# Patient Record
Sex: FEMALE | Race: WHITE | NOT HISPANIC OR LATINO | Employment: OTHER | URBAN - METROPOLITAN AREA
[De-identification: names, ages, dates, MRNs, and addresses within clinical notes are randomized per-mention and may not be internally consistent; named-entity substitution may affect disease eponyms.]

---

## 2023-10-06 PROBLEM — L57.0 ACTINIC KERATOSIS: Status: ACTIVE | Noted: 2023-06-15

## 2023-10-06 PROBLEM — L30.9 DERMATITIS, UNSPECIFIED: Status: ACTIVE | Noted: 2023-06-15

## 2023-10-06 PROBLEM — R21 RASH AND OTHER NONSPECIFIC SKIN ERUPTION: Status: ACTIVE | Noted: 2023-06-15

## 2023-10-06 PROBLEM — L90.5 SCAR CONDITION AND FIBROSIS OF SKIN: Status: ACTIVE | Noted: 2023-06-15

## 2023-10-06 PROBLEM — S81.802D UNSPECIFIED OPEN WOUND, LEFT LOWER LEG, SUBSEQUENT ENCOUNTER: Status: ACTIVE | Noted: 2023-06-15

## 2023-10-06 PROBLEM — S61.219A FINGER LACERATION: Status: ACTIVE | Noted: 2023-10-06

## 2023-10-06 PROBLEM — D48.5 NEOPLASM OF UNCERTAIN BEHAVIOR OF SKIN: Status: ACTIVE | Noted: 2023-06-15

## 2023-10-06 PROBLEM — L91.0 HYPERTROPHIC SCAR: Status: ACTIVE | Noted: 2023-06-15

## 2023-10-06 PROBLEM — L82.1 OTHER SEBORRHEIC KERATOSIS: Status: ACTIVE | Noted: 2023-06-15

## 2023-10-06 PROBLEM — C44.722 SQUAMOUS CELL CARCINOMA OF SKIN OF RIGHT LOWER LIMB, INCLUDING HIP: Status: ACTIVE | Noted: 2023-06-15

## 2023-10-06 PROBLEM — L40.0 PSORIASIS VULGARIS: Status: ACTIVE | Noted: 2023-06-15

## 2023-10-06 PROBLEM — D22.60 MELANOCYTIC NEVI OF UNSPECIFIED UPPER LIMB, INCLUDING SHOULDER: Status: ACTIVE | Noted: 2023-06-15

## 2023-10-06 PROBLEM — D22.70 MELANOCYTIC NEVI OF UNSPECIFIED LOWER LIMB, INCLUDING HIP: Status: ACTIVE | Noted: 2023-06-15

## 2023-10-06 PROBLEM — D22.30 MELANOCYTIC NEVI OF UNSPECIFIED PART OF FACE: Status: ACTIVE | Noted: 2023-06-15

## 2023-10-06 PROBLEM — B35.1 TINEA UNGUIUM: Status: ACTIVE | Noted: 2023-06-15

## 2023-10-06 PROBLEM — D22.5 MELANOCYTIC NEVI OF TRUNK: Status: ACTIVE | Noted: 2023-06-15

## 2023-10-06 PROBLEM — L81.4 OTHER MELANIN HYPERPIGMENTATION: Status: ACTIVE | Noted: 2023-06-15

## 2023-10-06 PROBLEM — D49.2 NEOPLASM OF UNSPECIFIED BEHAVIOR OF BONE, SOFT TISSUE, AND SKIN: Status: ACTIVE | Noted: 2023-06-15

## 2023-10-06 PROBLEM — D18.01 HEMANGIOMA OF SKIN AND SUBCUTANEOUS TISSUE: Status: ACTIVE | Noted: 2023-06-15

## 2023-10-06 PROBLEM — Z85.820 PERSONAL HISTORY OF MALIGNANT MELANOMA OF SKIN: Status: ACTIVE | Noted: 2023-06-15

## 2023-10-06 RX ORDER — TRIAMCINOLONE ACETONIDE 1 MG/G
1 CREAM TOPICAL
COMMUNITY
Start: 2016-09-02 | End: 2023-10-10 | Stop reason: ALTCHOICE

## 2023-10-06 RX ORDER — PANTOPRAZOLE SODIUM 20 MG/1
TABLET, DELAYED RELEASE ORAL
COMMUNITY
Start: 2018-09-26

## 2023-10-06 RX ORDER — CICLOPIROX 80 MG/ML
1 SOLUTION TOPICAL
COMMUNITY
Start: 2016-06-13 | End: 2023-10-10 | Stop reason: ALTCHOICE

## 2023-10-06 RX ORDER — FLUOCINONIDE 0.5 MG/G
1 CREAM TOPICAL
COMMUNITY
Start: 2020-06-15 | End: 2023-10-10 | Stop reason: ALTCHOICE

## 2023-10-06 RX ORDER — FLUOROURACIL 5 MG/G
CREAM TOPICAL
COMMUNITY
Start: 2015-10-09 | End: 2023-10-10 | Stop reason: ALTCHOICE

## 2023-10-06 RX ORDER — MUPIROCIN 20 MG/G
1 OINTMENT TOPICAL
COMMUNITY
Start: 2021-02-17 | End: 2023-10-10 | Stop reason: ALTCHOICE

## 2023-10-06 RX ORDER — FLUOCINOLONE ACETONIDE 0.1 MG/ML
1 SOLUTION TOPICAL
COMMUNITY
Start: 2020-06-17 | End: 2023-10-10 | Stop reason: ALTCHOICE

## 2023-10-06 RX ORDER — SUMATRIPTAN SUCCINATE 25 MG/1
TABLET ORAL
COMMUNITY
Start: 2018-09-26

## 2023-10-06 RX ORDER — ALENDRONATE SODIUM 70 MG/1
TABLET ORAL
COMMUNITY
Start: 2018-09-26 | End: 2023-10-10 | Stop reason: ALTCHOICE

## 2023-10-10 ENCOUNTER — OFFICE VISIT (OUTPATIENT)
Dept: DERMATOLOGY | Facility: CLINIC | Age: 68
End: 2023-10-10
Payer: MEDICARE

## 2023-10-10 DIAGNOSIS — D48.5 NEOPLASM OF UNCERTAIN BEHAVIOR OF SKIN: Primary | ICD-10-CM

## 2023-10-10 PROCEDURE — 1036F TOBACCO NON-USER: CPT | Performed by: DERMATOLOGY

## 2023-10-10 PROCEDURE — 11102 TANGNTL BX SKIN SINGLE LES: CPT | Performed by: DERMATOLOGY

## 2023-10-10 PROCEDURE — 11103 TANGNTL BX SKIN EA SEP/ADDL: CPT | Performed by: DERMATOLOGY

## 2023-10-10 PROCEDURE — 88305 TISSUE EXAM BY PATHOLOGIST: CPT | Performed by: DERMATOLOGY

## 2023-10-10 PROCEDURE — 1159F MED LIST DOCD IN RCRD: CPT | Performed by: DERMATOLOGY

## 2023-10-10 PROCEDURE — 88305 TISSUE EXAM BY PATHOLOGIST: CPT

## 2023-10-10 ASSESSMENT — DERMATOLOGY QUALITY OF LIFE (QOL) ASSESSMENT
ARE THERE EXCLUSIONS OR EXCEPTIONS FOR THE QUALITY OF LIFE ASSESSMENT: NO
WHAT SINGLE SKIN CONDITION LISTED BELOW IS THE PATIENT ANSWERING THE QUALITY-OF-LIFE ASSESSMENT QUESTIONS ABOUT: NONE OF THE ABOVE
RATE HOW EMOTIONALLY BOTHERED YOU ARE BY YOUR SKIN PROBLEM (FOR EXAMPLE, WORRY, EMBARRASSMENT, FRUSTRATION): 1
RATE HOW BOTHERED YOU ARE BY SYMPTOMS OF YOUR SKIN PROBLEM (EG, ITCHING, STINGING BURNING, HURTING OR SKIN IRRITATION): 1
RATE HOW BOTHERED YOU ARE BY EFFECTS OF YOUR SKIN PROBLEMS ON YOUR ACTIVITIES (EG, GOING OUT, ACCOMPLISHING WHAT YOU WANT, WORK ACTIVITIES OR YOUR RELATIONSHIPS WITH OTHERS): 0 - NEVER BOTHERED

## 2023-10-10 ASSESSMENT — DERMATOLOGY PATIENT ASSESSMENT
DO YOU USE SUNSCREEN: DAILY
FOR PATIENTS COMING IN FOR A FOLLOW-UP VISIT - HAVE THERE BEEN ANY CHANGES IN YOUR HEALTH SINCE YOUR LAST VISIT: NO
DO YOU HAVE ANY NEW OR CHANGING LESIONS: YES
DO YOU USE A TANNING BED: NO
ARE YOU AN ORGAN TRANSPLANT RECIPIENT: NO

## 2023-10-10 ASSESSMENT — ITCH NUMERIC RATING SCALE: HOW SEVERE IS YOUR ITCHING?: 0

## 2023-10-10 NOTE — PROGRESS NOTES
Subjective     Anali Aranda is a 68 y.o. female who presents for the following: Suspicious Skin Lesion (Pt here for lesion on right chest, and back. Chest lesion was bleeding. Pt has hx of SCC, BCC, AKs. ).     Review of Systems:  No other skin or systemic complaints other than what is documented elsewhere in the note.    The following portions of the chart were reviewed this encounter and updated as appropriate:          Skin Cancer History  No skin cancer on file.      Specialty Problems          Dermatology Problems    Actinic keratosis    Dermatitis, unspecified    Hemangioma of skin and subcutaneous tissue    Hypertrophic scar    Melanocytic nevi of trunk    Melanocytic nevi of unspecified lower limb, including hip    Melanocytic nevi of unspecified part of face    Melanocytic nevi of unspecified upper limb, including shoulder    Neoplasm of uncertain behavior of skin    Other melanin hyperpigmentation    Other seborrheic keratosis    Personal history of malignant melanoma of skin    Psoriasis vulgaris    Rash and other nonspecific skin eruption    Scar condition and fibrosis of skin    Squamous cell carcinoma of skin of right lower limb, including hip    Tinea unguium        Objective   Well appearing patient in no apparent distress; mood and affect are within normal limits.    A focused skin examination was performed of the right upper chest and right mid back. All findings within normal limits unless otherwise noted below.    Assessment/Plan   1. Neoplasm of uncertain behavior of skin (2)  Right lateral chest  Erythematous scaly papule          Lesion biopsy  Type of biopsy: tangential    Informed consent: discussed and consent obtained    Timeout: patient name, date of birth, surgical site, and procedure verified    Procedure prep:  Patient was prepped and draped  Anesthesia: the lesion was anesthetized in a standard fashion    Anesthetic:  1% lidocaine w/ epinephrine 1-100,000 local  infiltration  Instrument used: DermaBlade    Hemostasis achieved with: aluminum chloride    Outcome: patient tolerated procedure well    Post-procedure details: sterile dressing applied and wound care instructions given    Dressing type: petrolatum and bandage      Specimen 1 - Dermatopathology- DERM LAB  Differential Diagnosis: ISK vs. SCC  Check Margins Yes/No?:    Comments:    Dermpath Lab: Routine Histopathology (formalin-fixed tissue)    Right Lower Back  Pink to erythematous scaly plaque          Lesion biopsy  Type of biopsy: tangential    Informed consent: discussed and consent obtained    Timeout: patient name, date of birth, surgical site, and procedure verified    Procedure prep:  Patient was prepped and draped  Anesthesia: the lesion was anesthetized in a standard fashion    Anesthetic:  1% lidocaine w/ epinephrine 1-100,000 local infiltration  Instrument used: DermaBlade    Hemostasis achieved with: aluminum chloride    Outcome: patient tolerated procedure well    Post-procedure details: sterile dressing applied and wound care instructions given    Dressing type: petrolatum and bandage      Specimen 2 - Dermatopathology- DERM LAB  Differential Diagnosis: ISK vs. SCC  Check Margins Yes/No?:    Comments:    Dermpath Lab: Routine Histopathology (formalin-fixed tissue)    Lesion concerning for ISK vs. SCC. The need for biopsy for definitive diagnosis recommended. Risks and benefits reviewed, see procedure note.      Follow up for FBSE as scheduled with Dr. Hayes,

## 2023-10-12 LAB
LABORATORY COMMENT REPORT: NORMAL
PATH REPORT.FINAL DX SPEC: NORMAL
PATH REPORT.GROSS SPEC: NORMAL
PATH REPORT.MICROSCOPIC SPEC OTHER STN: NORMAL
PATH REPORT.RELEVANT HX SPEC: NORMAL
PATH REPORT.TOTAL CANCER: NORMAL

## 2023-12-14 ENCOUNTER — OFFICE VISIT (OUTPATIENT)
Dept: DERMATOLOGY | Facility: CLINIC | Age: 68
End: 2023-12-14
Payer: MEDICARE

## 2023-12-14 DIAGNOSIS — L81.4 LENTIGO: ICD-10-CM

## 2023-12-14 DIAGNOSIS — D18.01 HEMANGIOMA OF SKIN: ICD-10-CM

## 2023-12-14 DIAGNOSIS — L82.1 SEBORRHEIC KERATOSIS: ICD-10-CM

## 2023-12-14 DIAGNOSIS — D48.5 NEOPLASM OF UNCERTAIN BEHAVIOR OF SKIN: ICD-10-CM

## 2023-12-14 DIAGNOSIS — Z85.828 HISTORY OF NONMELANOMA SKIN CANCER: ICD-10-CM

## 2023-12-14 DIAGNOSIS — L90.5 SCAR CONDITIONS AND FIBROSIS OF SKIN: ICD-10-CM

## 2023-12-14 DIAGNOSIS — Z80.8 FAMILY HISTORY OF MELANOMA: ICD-10-CM

## 2023-12-14 DIAGNOSIS — Z12.83 SCREENING EXAM FOR SKIN CANCER: Primary | ICD-10-CM

## 2023-12-14 PROCEDURE — 88305 TISSUE EXAM BY PATHOLOGIST: CPT | Performed by: DERMATOLOGY

## 2023-12-14 PROCEDURE — 88305 TISSUE EXAM BY PATHOLOGIST: CPT | Mod: TC,DER | Performed by: DERMATOLOGY

## 2023-12-14 PROCEDURE — 1036F TOBACCO NON-USER: CPT | Performed by: DERMATOLOGY

## 2023-12-14 PROCEDURE — 1159F MED LIST DOCD IN RCRD: CPT | Performed by: DERMATOLOGY

## 2023-12-14 PROCEDURE — 11302 SHAVE SKIN LESION 1.1-2.0 CM: CPT

## 2023-12-14 PROCEDURE — 99213 OFFICE O/P EST LOW 20 MIN: CPT | Performed by: DERMATOLOGY

## 2023-12-14 ASSESSMENT — DERMATOLOGY QUALITY OF LIFE (QOL) ASSESSMENT
RATE HOW BOTHERED YOU ARE BY SYMPTOMS OF YOUR SKIN PROBLEM (EG, ITCHING, STINGING BURNING, HURTING OR SKIN IRRITATION): 0 - NEVER BOTHERED
WHAT SINGLE SKIN CONDITION LISTED BELOW IS THE PATIENT ANSWERING THE QUALITY-OF-LIFE ASSESSMENT QUESTIONS ABOUT: NONE OF THE ABOVE
ARE THERE EXCLUSIONS OR EXCEPTIONS FOR THE QUALITY OF LIFE ASSESSMENT: NO
DATE THE QUALITY-OF-LIFE ASSESSMENT WAS COMPLETED: 66822
WHAT SINGLE SKIN CONDITION LISTED BELOW IS THE PATIENT ANSWERING THE QUALITY-OF-LIFE ASSESSMENT QUESTIONS ABOUT: NONE OF THE ABOVE
RATE HOW EMOTIONALLY BOTHERED YOU ARE BY YOUR SKIN PROBLEM (FOR EXAMPLE, WORRY, EMBARRASSMENT, FRUSTRATION): 0 - NEVER BOTHERED
WHAT SINGLE SKIN CONDITION LISTED BELOW IS THE PATIENT ANSWERING THE QUALITY-OF-LIFE ASSESSMENT QUESTIONS ABOUT: NONE OF THE ABOVE
RATE HOW BOTHERED YOU ARE BY EFFECTS OF YOUR SKIN PROBLEMS ON YOUR ACTIVITIES (EG, GOING OUT, ACCOMPLISHING WHAT YOU WANT, WORK ACTIVITIES OR YOUR RELATIONSHIPS WITH OTHERS): 0 - NEVER BOTHERED

## 2023-12-14 ASSESSMENT — ITCH NUMERIC RATING SCALE: HOW SEVERE IS YOUR ITCHING?: 0

## 2023-12-14 ASSESSMENT — DERMATOLOGY PATIENT ASSESSMENT
DO YOU HAVE ANY NEW OR CHANGING LESIONS: YES
DO YOU USE A TANNING BED: NO
ARE YOU AN ORGAN TRANSPLANT RECIPIENT: NO
HAVE YOU HAD OR DO YOU HAVE VASCULAR DISEASE: NO
HAVE YOU HAD OR DO YOU HAVE A STAPH INFECTION: NO
DO YOU USE SUNSCREEN: OCCASIONALLY

## 2023-12-14 ASSESSMENT — PATIENT GLOBAL ASSESSMENT (PGA): PATIENT GLOBAL ASSESSMENT: PATIENT GLOBAL ASSESSMENT:  1 - CLEAR

## 2023-12-14 NOTE — PROGRESS NOTES
Subjective   Anali Aranda is a 68 y.o. female with a PMHx of NMSC and family history of melanoma who presents for the following: Skin Check (Patient presents today for 6 month skin exam).    Skin Cancer Screening  She has a history of moderate sun exposure. She is in the sun occasionally. She uses sunscreen occasionally. She reports itching. Her moles are not changing.    Spots that concern her: Left calf, right back, right nose, right eyebrow, bilateral arms, left ankle, left cheek    History of Skin Cancer  History of Basal Cell Carcinoma  History of Squamous Cell Carcinoma  History of Actinic Keratosis      Objective   Well appearing patient in no apparent distress; mood and affect are within normal limits.    A full examination was performed including scalp, head, eyes, ears, nose, lips, neck, chest, axillae, abdomen, back, buttocks, bilateral upper extremities, bilateral lower extremities, hands, feet, fingers, toes, fingernails, and toenails. All findings within normal limits unless otherwise noted below.    Scattered cherry-red papule(s).    Scattered tan macules in sun-exposed areas.    Stuck on verrucous, tan-brown papules and plaques.      Scars are well-healed without evidence of recurrence    Left posterior lower leg  1.1 cm brown, pink indurated plaque            Assessment/Plan   Screening exam for skin cancer    Neoplasm of uncertain behavior of skin  Left posterior lower leg    Shave removal    Lesion diameter (cm):  1.1  Informed consent: discussed and consent obtained    Timeout: patient name, date of birth, surgical site, and procedure verified    Procedure prep:  Patient was prepped and draped  Anesthesia: the lesion was anesthetized in a standard fashion    Anesthetic:  1% lidocaine w/ epinephrine 1-100,000 local infiltration  Instrument used: DermaBlade    Hemostasis achieved with: aluminum chloride    Outcome: patient tolerated procedure well    Post-procedure details: sterile dressing  applied and wound care instructions given    Dressing type: bandage and petrolatum      Staff Communication: Dermatology Local Anesthesia: 1 % Lidocaine / Epinephrine - Amount: 3ccs    Specimen 1 - Dermatopathology- DERM LAB  Differential Diagnosis: collision SK vs scar vs nevus  Check Margins Yes/No?:  yes  Comments:    Dermpath Lab: Routine Histopathology (formalin-fixed tissue)    Related Procedures  Follow Up In Dermatology - Established Patient    Hemangioma of skin    Benign finding; no intervention indicated today.    Lentigo    The ABCDEs of melanoma and warning signs of non-melanoma skin cancer were discussed with patient and patient expressed understanding. Sun protection and use of at least SPF 30 discussed with patient. Pt instructed to reapply every 2 hours.     Seborrheic keratosis    Benign finding; no intervention indicated today.    Scar conditions and fibrosis of skin    Reassured patient that previous procedure sites look healthy    History of nonmelanoma skin cancer    Family history of melanoma      Return to clinic in 6 mo for FBSE.    Idania Giron MD, LOW  PGY-2, Department of Dermatology    I saw and evaluated the patient. I personally obtained the key and critical portions of the history and physical exam or was physically present for key and critical portions performed by the student/resident. I reviewed the student/resident's documentation and discussed the patient with the student/resident. I was present for the entirety of any procedure(s). I agree with the student/resident's medical decision making as documented in the note.

## 2023-12-19 DIAGNOSIS — C44.729 SQUAMOUS CELL CARCINOMA OF SKIN OF LEFT LOWER LIMB, INCLUDING HIP: Primary | ICD-10-CM

## 2024-01-12 ENCOUNTER — OFFICE VISIT (OUTPATIENT)
Dept: DERMATOLOGY | Facility: CLINIC | Age: 69
End: 2024-01-12
Payer: MEDICARE

## 2024-01-12 DIAGNOSIS — C44.92 SQUAMOUS CELL CARCINOMA OF SKIN: ICD-10-CM

## 2024-01-12 PROCEDURE — 1159F MED LIST DOCD IN RCRD: CPT | Performed by: DERMATOLOGY

## 2024-01-12 PROCEDURE — 17313 MOHS 1 STAGE T/A/L: CPT | Performed by: STUDENT IN AN ORGANIZED HEALTH CARE EDUCATION/TRAINING PROGRAM

## 2024-01-12 PROCEDURE — 1036F TOBACCO NON-USER: CPT | Performed by: DERMATOLOGY

## 2024-01-12 PROCEDURE — 12032 INTMD RPR S/A/T/EXT 2.6-7.5: CPT | Performed by: STUDENT IN AN ORGANIZED HEALTH CARE EDUCATION/TRAINING PROGRAM

## 2024-01-12 RX ORDER — CHLORHEXIDINE GLUCONATE 40 MG/ML
SOLUTION TOPICAL DAILY PRN
Qty: 118 ML | Refills: 0 | Status: SHIPPED | OUTPATIENT
Start: 2024-01-12 | End: 2024-02-11

## 2024-01-12 RX ORDER — MUPIROCIN 20 MG/G
OINTMENT TOPICAL DAILY
Qty: 22 G | Refills: 0 | Status: SHIPPED | OUTPATIENT
Start: 2024-01-12 | End: 2024-02-11

## 2024-01-12 NOTE — PROGRESS NOTES
1`Office Visit Note  Date: 1/12/2024  Surgeon:  José Manuel Lux MD PhD  Office Location: [facility]  [facility] [address]  [facility] [address]  Dept: [phone]  Dept Fax: [phone]  Loc: [phone]  Referring Provider: Flora Hayes MD  [address] [facility] [address]  [address]   Anali Aranda is a 68 y.o. female who presents for the following: MOHS Surgery (Left posterior lower leg, SCC)    According to the patient, the lesion has been present for approximately 6 months at the time of diagnosis.  The lesion is painful.  The lesion has not been treated previously.    The patient does not have a pacemaker / defibrillator.  The patient does not have a heart valve / joint replacement.    The patient is not on blood thinners.  The patient does not have a history of hepatitis B or C.  The patient does not have a history of HIV.  The patient does not have a history of immunosuppression (e.g. organ transplantation, malignancy, medications)    Review of Systems:  No other skin or systemic complaints other than what is documented elsewhere in the note.    MEDICAL HISTORY: clinically relevant history including significant past medical history, medications and allergies was reviewed and documented in Epic.    Objective   Well appearing patient in no apparent distress; mood and affect are within normal limits.  Vital signs: See record.  Noted on the Left posterior Lower Leg  Is a 1.0 x 1.2 cm scar                      The patient confirmed the identified site.    Discussion:  The nature of the diagnosis was explained. The lesion is a skin cancer.  It has a risk of local growth and distant spread. The condition is associated with sun exposure.  Warning signs of non-melanoma skin cancer discussed. Patient was instructed to perform monthly self skin examination.  We recommended that the patient have regular full skin exams given an increased risk of  subsequent skin cancers. The patient was instructed to use sun protective behaviors including use of broad spectrum sunscreens and sun protective clothing to reduce risk of skin cancers.      Risks, benefits, side effects of Mohs surgery were discussed with patient and the patient voiced understanding.  It was explained that even though the cure rate of Mohs is very high it is not 100%. Risks of surgery including but not limited to bleeding, infection, numbness, nerve damage, and scar were reviewed.  Discussion included wound care requirements, activity restrictions, likely scar outcome and time to heal.     After Mohs surgery, the defect may need to be repaired surgically and the scar may be longer than the original lesion.  Reconstruction options, risks, and benefits were reviewed including second intention healing, linear repair (4-1 ratio was explained), local flaps, skin grafts, cartilage grafts and interpolation flaps (the need for multiple surgeries was explained). Possible outcomes were reviewed including likely scar appearance, failure of flap survival, infection, bleeding and the need for revision surgery.     The pathology was reviewed, the photograph was reviewed, and the referring physician's note was reviewed.    Patient elected for Mohs surgery.

## 2024-01-12 NOTE — PROGRESS NOTES
Mohs Surgery Operative Note    Date of Surgery:  1/12/2024  Surgeon:  José Manuel Lux MD PhD  Office Location: [facility]  [facility] [address]  Dept: [phone]  Dept Fax: [phone]  Loc: [phone]  Referring Provider: Flora Hayes MD  [address] [facility] [address]  [place] [id number]      Assessment/Plan   Pre-procedure:   Obtained informed consent: written from patient  The surgical site was identified and confirmed with the patient.     Intra-operative:   Audible time out called at : 8:48 AM 01/16/24  by: José Manuel Lux MD PhD   Verified patient name, birthdate, site, specimen bottle label & requisition.    The planned procedure(s) was again reviewed with the patient. The risks of bleeding, infection, nerve damage and scarring were reviewed. Written authorization was obtained. The patient identity, surgical site, and planned procedure(s) were verified. The provider acted as both surgeon and pathologist.     Squamous cell carcinoma of skin  Left posterior Lower Leg    Mohs surgery    Consent obtained: written    Universal Protocol:  Procedure explained and questions answered to patient or proxy's satisfaction: Yes    Test results available and properly labeled: Yes    Pathology report reviewed: Yes    External notes reviewed: Yes    Photo or diagram used for site identification: Yes    Site/side marked: Yes    Slide independently reviewed by Mohs surgeon: Yes    Immediately prior to procedure a time out was called: Yes    Patient identity confirmed: verbally with patient  Preparation: Patient was prepped and draped in usual sterile fashion      Anticoagulation:  Is the patient taking prescription anticoagulant and/or aspirin prescribed/recommended by a physician? No    Was the anticoagulation regimen changed prior to Mohs? No      Anesthesia:  Anesthesia method: local infiltration  Local anesthetic: lidocaine 1% WITH epi    Procedure  Details:  Biopsy accession number: [id number]  Date of biopsy: 12/14/2023  Frozen section biopsy performed: No    Specimen debulked: No    Pre-Op diagnosis: squamous cell carcinoma  SCC subtype: moderately differentiated  Surgery side: left  Surgical site (from skin exam): Left posterior Lower Leg  Pre-operative length (cm): 1  Pre-operative width (cm): 1.2  Indications for Mohs surgery: anatomic location where tissue conservation is critical  Previously treated? No      Micrographic Surgery Details:  Post-operative length (cm): 1.3  Post-operative width (cm): 1.5  Number of Mohs stages: 1  Is this a complex case (associate members only): No      Stage 1     Comments: The patient was brought into the operating room and placed in the procedure chair in the appropriate position.  The area positive by previous biopsy was identified and confirmed with the patient. The area of clinically obvious tumor was debulked using a curette and/or scalpel as needed. An incision was made following the Mohs approach through the skin. The specimen was taken to the lab, divided into 2 piece(s) and appropriately chromacoded and processed.                 Tumor features identified on Mohs section: no tumor identified    Depth of defect: subcutaneous fat    Patient tolerance of procedure: tolerated well, no immediate complications    Reconstruction:  Was the defect reconstructed? Yes    Was reconstruction performed by the same Mohs surgeon? Yes    Setting of reconstruction: outpatient office  When was reconstruction performed? same day  Type of reconstruction: linear  Linear reconstruction: intermediate  Length of linear repair (cm): 5  Subcutaneous Layers (Deep Stitches)   Suture size:  4-0  Suture type:  Vicryl  Stitches:  Buried vertical mattress  Fine/surface layer approximation (top stitches)   Epidermal/Superficial suture size:  4-0  Epidermal/Superficial suture type:  Prolene  Stitches: simple running    Hemostasis achieved with:  electrodesiccation  Outcome: patient tolerated procedure well with no complications    Post-procedure details: sterile dressing applied and wound care instructions given    Dressing type: petrolatum, pressure dressing and Hypafix      Staff Communication: Dermatology Local Anesthesia: 1 % Lidocaine / Epinephrine - Amount:9cc's    Related Medications  chlorhexidine (Hibiclens) 4 % external liquid  Apply topically once daily as needed for wound care.    mupirocin (Bactroban) 2 % ointment  Apply topically once daily.      Intermediate Linear Repair:  Given the location and size of the defect, it was determined that an intermediate layered linear closure was required to restore normal anatomy and function. The repair is an intermediate closure as two layers of sutures were required. The defect was undermined extensively at the level of the subcutaneous plane. Standing cutaneous cones were removed using Burow's triangles. The wound edges were brought into close approximation with buried vertical mattress sutures. The remainder of the wound was then closed with epidermal top sutures.    The final repair measured 5.0 cm    Wound care was discussed, and the patient was given written post-operative wound care instructions.      The patient will follow up with José Manuel Lux MD PhD as needed for any post operative problems or concerns, and will follow up with their primary dermatologist as scheduled.

## 2024-01-17 ENCOUNTER — APPOINTMENT (OUTPATIENT)
Dept: DERMATOLOGY | Facility: CLINIC | Age: 69
End: 2024-01-17
Payer: MEDICARE

## 2024-01-26 ENCOUNTER — OFFICE VISIT (OUTPATIENT)
Dept: DERMATOLOGY | Facility: CLINIC | Age: 69
End: 2024-01-26
Payer: MEDICARE

## 2024-01-26 DIAGNOSIS — Z48.02 VISIT FOR SUTURE REMOVAL: ICD-10-CM

## 2024-01-26 PROCEDURE — 1159F MED LIST DOCD IN RCRD: CPT | Performed by: DERMATOLOGY

## 2024-01-26 PROCEDURE — 99024 POSTOP FOLLOW-UP VISIT: CPT | Performed by: DERMATOLOGY

## 2024-01-26 PROCEDURE — 1036F TOBACCO NON-USER: CPT | Performed by: DERMATOLOGY

## 2024-01-26 NOTE — PROGRESS NOTES
Office Follow Up Note    Visit Summary  Chief Complaint    1. Complaint Wound check.    Anali Aranda is a 68 y.o. female who presents for 2 week follow up after surgery for a squamous cell carcinoma. The patient has no concerns today.     Location Operation site location: Left lower leg-posterior.    On exam,  Ms. Aranda is well-appearing and in no apparent distress. The surgical site appears clean with minimal to no erythema. No tenderness and good wound edge apposition.    Assessment and Plan:  History of skin cancer requiring ongoing monitoring for recurrence and additional lesion development.   The patient was reassured that the wound is healing appropriately.   Sutures were removed without complication today.  The dressing was removed, the wound cleaned a a new dressing reapplied.     The patient was advised on the importance of sun protection and routine skin monitoring and instructed to call with any further concerns. The patient will return as needed

## 2024-02-07 ENCOUNTER — APPOINTMENT (OUTPATIENT)
Dept: DERMATOLOGY | Facility: CLINIC | Age: 69
End: 2024-02-07
Payer: MEDICARE

## 2024-03-13 ENCOUNTER — HOSPITAL ENCOUNTER (OUTPATIENT)
Dept: RADIOLOGY | Facility: CLINIC | Age: 69
Discharge: HOME | End: 2024-03-13
Payer: MEDICARE

## 2024-03-13 ENCOUNTER — OFFICE VISIT (OUTPATIENT)
Dept: ORTHOPEDIC SURGERY | Facility: CLINIC | Age: 69
End: 2024-03-13
Payer: MEDICARE

## 2024-03-13 DIAGNOSIS — M79.645 THUMB PAIN, LEFT: ICD-10-CM

## 2024-03-13 PROCEDURE — 1159F MED LIST DOCD IN RCRD: CPT | Performed by: INTERNAL MEDICINE

## 2024-03-13 PROCEDURE — 99203 OFFICE O/P NEW LOW 30 MIN: CPT | Performed by: INTERNAL MEDICINE

## 2024-03-13 PROCEDURE — 73140 X-RAY EXAM OF FINGER(S): CPT | Mod: LEFT SIDE | Performed by: INTERNAL MEDICINE

## 2024-03-13 PROCEDURE — 73140 X-RAY EXAM OF FINGER(S): CPT | Mod: LT

## 2024-03-13 PROCEDURE — 99213 OFFICE O/P EST LOW 20 MIN: CPT | Performed by: INTERNAL MEDICINE

## 2024-03-13 PROCEDURE — 1036F TOBACCO NON-USER: CPT | Performed by: INTERNAL MEDICINE

## 2024-03-13 NOTE — PROGRESS NOTES
Acute Injury New Patient Visit    CC:   Chief Complaint   Patient presents with    Left Thumb - Pain       HPI: Anali is a 69 y.o. female presents today for evaluation for acute left thumb injury sustained yesterday when a picture frame fell onto the left thumb. She is here for initial evaluation and x-rays.        Review of Systems   GENERAL: Negative for malaise, significant weight loss, fever  MUSCULOSKELETAL: See HPI  NEURO:  Negative for numbness / tingling     Past Medical History  Past Medical History:   Diagnosis Date    Actinic keratosis     Basal cell carcinoma     Migraines     Squamous cell skin cancer        Medication review  Medication Documentation Review Audit       Reviewed by Dara Peres RN (Registered Nurse) on 01/12/24 at 0944      Medication Order Taking? Sig Documenting Provider Last Dose Status   pantoprazole (Protonix) 20 mg EC tablet [id number]  Take by mouth. Historical Provider, MD  Active   SUMAtriptan (Imitrex) 25 mg tablet [id number]  Take by mouth. Historical Provider, MD  Active                    Allergies  No Known Allergies    Social History  Social History     Socioeconomic History    Marital status:      Spouse name: Not on file    Number of children: Not on file    Years of education: Not on file    Highest education level: Not on file   Occupational History    Not on file   Tobacco Use    Smoking status: Never    Smokeless tobacco: Never   Vaping Use    Vaping Use: Never used   Substance and Sexual Activity    Alcohol use: Defer    Drug use: Defer    Sexual activity: Defer   Other Topics Concern    Not on file   Social History Narrative    Not on file     Social Determinants of Health     Financial Resource Strain: Not on file   Food Insecurity: Not on file   Transportation Needs: Not on file   Physical Activity: Not on file   Stress: Not on file   Social Connections: Not on file   Intimate Partner Violence: Not on file   Housing Stability: Not on file        Surgical History  No past surgical history on file.    Physical Exam:  GENERAL:  Patient is awake, alert, and oriented to person place and time.  Patient appears well nourished and well kept.  Affect Calm, Not Acutely Distressed.  HEENT:  Normocephalic, Atraumatic, EOMI  CARDIOVASCULAR:  Hemodynamically stable.  RESPIRATORY:  Normal respirations with unlabored breathing.  Extremity: Left thumb shows obvious swelling the left thumb.  Nailbed is intact.  Associated bruising and ecchymosis.  There is no clinical signs of infection.  Pain over distal phalanx.  There is no pain in the proximal phalanx.  Her flexor and extensor mechanism intact.  Satisfactory capillary refill time.  There is no pain in the metacarpals.  No pain or laxity with UCL ligament stress test.  Right thumb was examined for comparison.      Diagnostics: X-rays reviewed      Procedure: None    Assessment: Acute nondisplaced distal phalanx fracture of the left thumb    Plan: Anali presents today for initial evaluation for acute left thumb injury sustained yesterday. She sustained an acute mini displaced distal phalanx fracture of the left thumb.  Will place her into a splint and follow-up with Dr. Chung to discuss further treatment options due to the unstable fracture pattern.  No fracture charge for today's visit, patient with Dr. Chung for continued fracture care.    Orders Placed This Encounter    XR thumb left MIN 2 views      At the conclusion of the visit there were no further questions by the patient/family regarding their plan of care.  Patient was instructed to call or return with any issues, questions, or concerns regarding their injury and/or treatment plan described above.     03/13/24 at 10:03 AM - Demetri Castañeda MD  Scribe Attestation  By signing my name below, I Claude Azael, Scribnoe   attest that this documentation has been prepared under the direction and in the presence of Demetri Castañeda MD.    Office: [phone]    This note was prepared using voice recognition software.  The details of this note are correct and have been reviewed, and corrected to the best of my ability.  Some grammatical errors may persist related to the Dragon software.

## 2024-03-14 ENCOUNTER — OFFICE VISIT (OUTPATIENT)
Dept: ORTHOPEDIC SURGERY | Facility: CLINIC | Age: 69
End: 2024-03-14
Payer: MEDICARE

## 2024-03-14 DIAGNOSIS — S62.522A DISPLACED FRACTURE OF DISTAL PHALANX OF LEFT THUMB, INITIAL ENCOUNTER FOR CLOSED FRACTURE: Primary | ICD-10-CM

## 2024-03-14 PROCEDURE — 99214 OFFICE O/P EST MOD 30 MIN: CPT | Performed by: ORTHOPAEDIC SURGERY

## 2024-03-14 PROCEDURE — 1159F MED LIST DOCD IN RCRD: CPT | Performed by: ORTHOPAEDIC SURGERY

## 2024-03-14 PROCEDURE — 1036F TOBACCO NON-USER: CPT | Performed by: ORTHOPAEDIC SURGERY

## 2024-03-14 NOTE — PROGRESS NOTES
History present illness: Patient presents today status post injury to the left thumb.  Injury occurred 3/12/2024.  Right-hand-dominant.  Otherwise healthy.      Past medical history: The patient's past medical history, family history, social history, and review of systems were documented on the patient medical intake.  The updated data was reviewed in the electronic medical record.  History is negative except otherwise stated in history of present illness.        Physical examination:  General: Alert and oriented to person, place, and time.  No acute distress and breathing comfortably: Pleasant and cooperative with examination.  HEENT: Head is normocephalic and atraumatic.  Neck: Supple, no visible swelling.  Cardiovascular: No palpable tachycardia  Lungs: No audible wheezing or labored breathing  Abdomen: Nondistended.  Extremities: Evaluation of the left upper extremity finds the patient had palpable radial artery at the wrist with brisk capillary refill to all digits.  Patient has intact sensation to axillary radial median and ulnar nerves.  There are no open wounds.  There are no signs of infection.  There is no evidence of lymphedema or lymphatic streaking.  The patient has supple compartments to left arm forearm and hand.  Swelling and ecchymosis to left thumb.  Tenderness palpation over the distal phalanx.  EPL FPL intact.      Radiology: X-rays of the left thumb demonstrate mildly displaced transverse fracture through midshaft of distal phalanx.      Assessment: Left thumb distal phalanx fracture      Plan: Treatment options were discussed.  We talked about operative and nonoperative strategies.  The patient understands that this has a slightly higher malunion/nonunion risk given the fracture pattern and location.  She elects to proceed forth with closed reduction percutaneous pinning.  She was placed back into her splint.  Nonweightbearing until surgery.  Plan for surgery under digital block  anesthesia.        Procedure:

## 2024-03-18 ENCOUNTER — TELEPHONE (OUTPATIENT)
Dept: ORTHOPEDIC SURGERY | Facility: CLINIC | Age: 69
End: 2024-03-18
Payer: MEDICARE

## 2024-03-18 NOTE — TELEPHONE ENCOUNTER
PATIENT CALLED SHE IS HAVING SURGERY ON WEDNESDAY, MARCH 20TH AT [facility]. SHE HAS SOME QUESTIONS REGARDING HER HISTORY OF OSTEOPOROSIS AND RECOVERY.    SHE CAN BE REACHED [phone]

## 2024-03-19 ENCOUNTER — ANESTHESIA EVENT (OUTPATIENT)
Dept: OPERATING ROOM | Age: 69
End: 2024-03-19
Payer: MEDICARE

## 2024-03-19 DIAGNOSIS — G89.18 POST-OP PAIN: Primary | ICD-10-CM

## 2024-03-19 RX ORDER — SUMATRIPTAN 25 MG/1
25 TABLET, FILM COATED ORAL
COMMUNITY

## 2024-03-19 RX ORDER — PANTOPRAZOLE SODIUM 20 MG/1
20 TABLET, DELAYED RELEASE ORAL DAILY
COMMUNITY

## 2024-03-19 RX ORDER — HYDROCODONE BITARTRATE AND ACETAMINOPHEN 5; 325 MG/1; MG/1
1 TABLET ORAL EVERY 8 HOURS PRN
Qty: 10 TABLET | Refills: 0 | Status: SHIPPED | OUTPATIENT
Start: 2024-03-19 | End: 2024-03-23

## 2024-03-19 NOTE — PROGRESS NOTES
Phone PAT completed, pt given pre-op instructions (per orange sheet) and verbalized understanding.  Electronically signed by Nelda Lewis RN on 3/19/2024 at 11:58 AM

## 2024-03-20 ENCOUNTER — APPOINTMENT (OUTPATIENT)
Dept: GENERAL RADIOLOGY | Age: 69
End: 2024-03-20
Attending: ORTHOPAEDIC SURGERY
Payer: MEDICARE

## 2024-03-20 ENCOUNTER — HOSPITAL ENCOUNTER (OUTPATIENT)
Age: 69
Setting detail: OUTPATIENT SURGERY
Discharge: HOME | End: 2024-03-20
Attending: ORTHOPAEDIC SURGERY | Admitting: ORTHOPAEDIC SURGERY
Payer: MEDICARE

## 2024-03-20 ENCOUNTER — ANESTHESIA (OUTPATIENT)
Dept: OPERATING ROOM | Age: 69
End: 2024-03-20
Payer: MEDICARE

## 2024-03-20 VITALS
RESPIRATION RATE: 16 BRPM | DIASTOLIC BLOOD PRESSURE: 84 MMHG | OXYGEN SATURATION: 99 % | HEART RATE: 85 BPM | HEIGHT: 65 IN | WEIGHT: 110 LBS | TEMPERATURE: 98 F | BODY MASS INDEX: 18.33 KG/M2 | SYSTOLIC BLOOD PRESSURE: 169 MMHG

## 2024-03-20 PROCEDURE — 7100000011 HC PHASE II RECOVERY - ADDTL 15 MIN: Performed by: ORTHOPAEDIC SURGERY

## 2024-03-20 PROCEDURE — 26727 TREAT FINGER FRACTURE EACH: CPT | Performed by: ORTHOPAEDIC SURGERY

## 2024-03-20 PROCEDURE — 3700000001 HC ADD 15 MINUTES (ANESTHESIA): Performed by: ORTHOPAEDIC SURGERY

## 2024-03-20 PROCEDURE — 6360000002 HC RX W HCPCS: Performed by: ANESTHESIOLOGY

## 2024-03-20 PROCEDURE — 3600000014 HC SURGERY LEVEL 4 ADDTL 15MIN: Performed by: ORTHOPAEDIC SURGERY

## 2024-03-20 PROCEDURE — 2580000003 HC RX 258: Performed by: ORTHOPAEDIC SURGERY

## 2024-03-20 PROCEDURE — 2709999900 HC NON-CHARGEABLE SUPPLY: Performed by: ORTHOPAEDIC SURGERY

## 2024-03-20 PROCEDURE — 3600000004 HC SURGERY LEVEL 4 BASE: Performed by: ORTHOPAEDIC SURGERY

## 2024-03-20 PROCEDURE — 2500000003 HC RX 250 WO HCPCS: Performed by: ANESTHESIOLOGY

## 2024-03-20 PROCEDURE — 6360000002 HC RX W HCPCS: Performed by: ORTHOPAEDIC SURGERY

## 2024-03-20 PROCEDURE — 7100000010 HC PHASE II RECOVERY - FIRST 15 MIN: Performed by: ORTHOPAEDIC SURGERY

## 2024-03-20 PROCEDURE — 3700000000 HC ANESTHESIA ATTENDED CARE: Performed by: ORTHOPAEDIC SURGERY

## 2024-03-20 PROCEDURE — 2580000003 HC RX 258: Performed by: ANESTHESIOLOGY

## 2024-03-20 PROCEDURE — A4217 STERILE WATER/SALINE, 500 ML: HCPCS | Performed by: ORTHOPAEDIC SURGERY

## 2024-03-20 RX ORDER — LIDOCAINE HYDROCHLORIDE 10 MG/ML
1 INJECTION, SOLUTION EPIDURAL; INFILTRATION; INTRACAUDAL; PERINEURAL
Status: DISCONTINUED | OUTPATIENT
Start: 2024-03-20 | End: 2024-03-20 | Stop reason: HOSPADM

## 2024-03-20 RX ORDER — BUPIVACAINE HYDROCHLORIDE 5 MG/ML
5 INJECTION, SOLUTION EPIDURAL; INTRACAUDAL
Status: DISCONTINUED | OUTPATIENT
Start: 2024-03-20 | End: 2024-03-20 | Stop reason: HOSPADM

## 2024-03-20 RX ORDER — FENTANYL CITRATE 0.05 MG/ML
25 INJECTION, SOLUTION INTRAMUSCULAR; INTRAVENOUS EVERY 10 MIN PRN
Status: CANCELLED | OUTPATIENT
Start: 2024-03-20

## 2024-03-20 RX ORDER — METOCLOPRAMIDE HYDROCHLORIDE 5 MG/ML
10 INJECTION INTRAMUSCULAR; INTRAVENOUS
Status: CANCELLED | OUTPATIENT
Start: 2024-03-20 | End: 2024-03-21

## 2024-03-20 RX ORDER — LIDOCAINE HYDROCHLORIDE 10 MG/ML
5 INJECTION, SOLUTION EPIDURAL; INFILTRATION; INTRACAUDAL; PERINEURAL
Status: DISCONTINUED | OUTPATIENT
Start: 2024-03-20 | End: 2024-03-20 | Stop reason: HOSPADM

## 2024-03-20 RX ORDER — MAGNESIUM HYDROXIDE 1200 MG/15ML
LIQUID ORAL CONTINUOUS PRN
Status: COMPLETED | OUTPATIENT
Start: 2024-03-20 | End: 2024-03-20

## 2024-03-20 RX ORDER — SODIUM CHLORIDE 0.9 % (FLUSH) 0.9 %
5-40 SYRINGE (ML) INJECTION EVERY 12 HOURS SCHEDULED
Status: CANCELLED | OUTPATIENT
Start: 2024-03-20

## 2024-03-20 RX ORDER — DIPHENHYDRAMINE HYDROCHLORIDE 50 MG/ML
12.5 INJECTION INTRAMUSCULAR; INTRAVENOUS
Status: CANCELLED | OUTPATIENT
Start: 2024-03-20 | End: 2024-03-21

## 2024-03-20 RX ORDER — SODIUM CHLORIDE 0.9 % (FLUSH) 0.9 %
5-40 SYRINGE (ML) INJECTION EVERY 12 HOURS SCHEDULED
Status: DISCONTINUED | OUTPATIENT
Start: 2024-03-20 | End: 2024-03-20 | Stop reason: HOSPADM

## 2024-03-20 RX ORDER — LIDOCAINE HYDROCHLORIDE AND EPINEPHRINE 10; 10 MG/ML; UG/ML
INJECTION, SOLUTION INFILTRATION; PERINEURAL PRN
Status: DISCONTINUED | OUTPATIENT
Start: 2024-03-20 | End: 2024-03-20 | Stop reason: SDUPTHER

## 2024-03-20 RX ORDER — SODIUM CHLORIDE 0.9 % (FLUSH) 0.9 %
5-40 SYRINGE (ML) INJECTION PRN
Status: DISCONTINUED | OUTPATIENT
Start: 2024-03-20 | End: 2024-03-20 | Stop reason: HOSPADM

## 2024-03-20 RX ORDER — BUPIVACAINE HYDROCHLORIDE 5 MG/ML
INJECTION, SOLUTION EPIDURAL; INTRACAUDAL PRN
Status: DISCONTINUED | OUTPATIENT
Start: 2024-03-20 | End: 2024-03-20 | Stop reason: SDUPTHER

## 2024-03-20 RX ORDER — SODIUM CHLORIDE, SODIUM LACTATE, POTASSIUM CHLORIDE, CALCIUM CHLORIDE 600; 310; 30; 20 MG/100ML; MG/100ML; MG/100ML; MG/100ML
INJECTION, SOLUTION INTRAVENOUS CONTINUOUS
Status: DISCONTINUED | OUTPATIENT
Start: 2024-03-20 | End: 2024-03-20 | Stop reason: HOSPADM

## 2024-03-20 RX ORDER — SODIUM CHLORIDE 0.9 % (FLUSH) 0.9 %
5-40 SYRINGE (ML) INJECTION PRN
Status: CANCELLED | OUTPATIENT
Start: 2024-03-20

## 2024-03-20 RX ORDER — SODIUM CHLORIDE 9 MG/ML
INJECTION, SOLUTION INTRAVENOUS PRN
Status: CANCELLED | OUTPATIENT
Start: 2024-03-20

## 2024-03-20 RX ORDER — SODIUM CHLORIDE 9 MG/ML
INJECTION, SOLUTION INTRAVENOUS PRN
Status: DISCONTINUED | OUTPATIENT
Start: 2024-03-20 | End: 2024-03-20 | Stop reason: HOSPADM

## 2024-03-20 RX ORDER — ONDANSETRON 2 MG/ML
4 INJECTION INTRAMUSCULAR; INTRAVENOUS
Status: CANCELLED | OUTPATIENT
Start: 2024-03-20 | End: 2024-03-21

## 2024-03-20 RX ADMIN — CEFAZOLIN 2000 MG: 2 INJECTION, POWDER, FOR SOLUTION INTRAMUSCULAR; INTRAVENOUS at 07:51

## 2024-03-20 RX ADMIN — SODIUM CHLORIDE, POTASSIUM CHLORIDE, SODIUM LACTATE AND CALCIUM CHLORIDE 1000 ML: 600; 310; 30; 20 INJECTION, SOLUTION INTRAVENOUS at 07:04

## 2024-03-20 RX ADMIN — LIDOCAINE HYDROCHLORIDE AND EPINEPHRINE 7 ML: 10; 10 INJECTION, SOLUTION INFILTRATION; PERINEURAL at 07:28

## 2024-03-20 RX ADMIN — BUPIVACAINE HYDROCHLORIDE 7 ML: 5 INJECTION, SOLUTION EPIDURAL; INTRACAUDAL; PERINEURAL at 07:28

## 2024-03-20 ASSESSMENT — PAIN SCALES - GENERAL: PAINLEVEL_OUTOF10: 2

## 2024-03-20 ASSESSMENT — PAIN - FUNCTIONAL ASSESSMENT: PAIN_FUNCTIONAL_ASSESSMENT: 0-10

## 2024-03-20 ASSESSMENT — PAIN DESCRIPTION - DESCRIPTORS
DESCRIPTORS: ACHING;DULL
DESCRIPTORS: ACHING;THROBBING

## 2024-03-20 ASSESSMENT — PAIN DESCRIPTION - ORIENTATION: ORIENTATION: LEFT

## 2024-03-20 ASSESSMENT — PAIN DESCRIPTION - LOCATION: LOCATION: FINGER (COMMENT WHICH ONE)

## 2024-03-20 NOTE — ANESTHESIA PRE PROCEDURE
Department of Anesthesiology  Preprocedure Note       Name:  Roxane Bassett   Age:  69 y.o.  :  1955                                          MRN:  [id number]         Date:  3/20/2024      Surgeon: Surgeon(s):  Ajay Portillo DO    Procedure: Procedure(s):  LEFT THUMB FINGER CLOSED REDUCTION PERCUTANEOUS PINNING LEFT THUMB DISTAL PHALANX FRACTURE, SUPINE, DIGITAL BLOCK, C-ARM, K-WIRES, LOCAL/MAC. JIMI AWARE    Medications prior to admission:   Prior to Admission medications    Medication Sig Start Date End Date Taking? Authorizing Provider   SUMAtriptan (IMITREX) 25 MG tablet Take 1 tablet by mouth once as needed for Migraine   Yes Provider, MD Davin   pantoprazole (PROTONIX) 20 MG tablet Take 1 tablet by mouth daily   Yes Provider, MD Davin       Current medications:    Current Facility-Administered Medications   Medication Dose Route Frequency Provider Last Rate Last Admin   • ceFAZolin (ANCEF) 2,000 mg in sodium chloride 0.9 % 100 mL IVPB (mini-bag)  2,000 mg IntraVENous Once Ajay Portillo DO       • lidocaine PF 1 % injection 5 mL  5 mL IntraDERmal Once PRN Ajay Portillo DO       • BUPivacaine (PF) (MARCAINE) 0.5 % injection 25 mg  5 mL IntraDERmal Once PRN Ajay Portillo DO       • lidocaine PF 1 % injection 1 mL  1 mL IntraDERmal Once PRN Adam Perry MD       • lactated ringers IV soln infusion   IntraVENous Continuous Adam Perry  mL/hr at 24 0704 1,000 mL at 24 0704   • sodium chloride flush 0.9 % injection 5-40 mL  5-40 mL IntraVENous 2 times per day Aadm Perry MD       • sodium chloride flush 0.9 % injection 5-40 mL  5-40 mL IntraVENous PRN Adam Perry MD       • 0.9 % sodium chloride infusion   IntraVENous PRN Adam Perry MD           Allergies:  No Known Allergies    Problem List:  There is no problem list on file for this patient.      Past Medical History:        Diagnosis Date   • Cancer (HCC)     squamous and basal

## 2024-03-20 NOTE — OP NOTE
translation of the distal segment relative to the proximal segment through the distal phalanx fracture.  Direct and indirect forces were used to impart reduction.  A 4 5 K wire was started at the tip of the distal phalanx and delivered in a retrograde fashion traversing the fracture plane and anchoring into the distal phalanx base.  In a similar fashion this time with a more radial start point a second 4 5 K wire was passed again starting at the tip of the distal phalanx working retrograde traversing the fracture plane and anchoring into the P2 base finalizing the construct.  The pins were cut in a subcuticular position.  Soft dressing and splint were placed.  The patient was allowed to head to recovery in stable condition.  Overall the patient tolerated procedure well.     Disposition: Stable to PACU  Electronically signed by EZEQUIEL UP DO on 3/20/2024 at 8:22 AM

## 2024-03-20 NOTE — ANESTHESIA PROCEDURE NOTES
Peripheral Block    Patient location during procedure: pre-op  Reason for block: primary anesthetic and at surgeon's request  Start time: 3/20/2024 7:25 AM  End time: 3/20/2024 7:30 AM  Staffing  Performed: anesthesiologist   Anesthesiologist: Adam Perry MD  Performed by: Adam Perry MD  Authorized by: Adam Perry MD    Preanesthetic Checklist  Completed: patient identified, IV checked, site marked, risks and benefits discussed, surgical/procedural consents, equipment checked, pre-op evaluation, timeout performed, anesthesia consent given, oxygen available, monitors applied/VS acknowledged, fire risk safety assessment completed and verbalized and blood product R/B/A discussed and consented  Peripheral Block   Patient position: supine  Prep: alcohol swabs  Provider prep: mask and sterile gloves  Patient monitoring: cardiac monitor, continuous pulse ox, frequent blood pressure checks, IV access and responsive to questions  Block type: Digital  Laterality: left  Injection technique: single-shot  Guidance: other  Local infiltration: lidocaine and bupivacaine (1 cc of Sodium bicarbonate added)  Infiltration strength: 0.5 %  Local infiltration: lidocaine and bupivacaine (1 cc of Sodium bicarbonate added)  Dose: 7 mLDose: 7 mL    Needle   Needle type: Other   Needle gauge: 27 G  Needle localization: anatomical landmarks  Assessment   Injection assessment: negative aspiration for heme, no paresthesia on injection and no intravascular symptoms  Paresthesia pain: none  Slow fractionated injection: yes  Hemodynamics: stable  Outcomes: uncomplicated    Additional Notes

## 2024-03-20 NOTE — ANESTHESIA POSTPROCEDURE EVALUATION
Department of Anesthesiology  Postprocedure Note    Patient: Roxane Bassett  MRN: [id number]  YOB: 1955  Date of evaluation: 3/20/2024    Procedure Summary       Date: 03/20/24 Room / Location: [address]    Anesthesia Start: 0751 Anesthesia Stop: 0819    Procedure: LEFT THUMB FINGER CLOSED REDUCTION PERCUTANEOUS PINNING LEFT THUMB DISTAL PHALANX FRACTURE, SUPINE, DIGITAL BLOCK, C-ARM, K-WIRES, LOCAL/MAC. JIMI AWARE (Left) Diagnosis:       Open nondisplaced fracture of distal phalanx of left thumb, initial encounter      (Open nondisplaced fracture of distal phalanx of left thumb, initial encounter [S62.525B])    Surgeons: Ajay Portillo DO Responsible Provider: Adam Perry MD    Anesthesia Type: MAC ASA Status: 2            Anesthesia Type: No value filed.    Toni Phase I: Toni Score: 10    Toni Phase II: Toni Score: 10    Anesthesia Post Evaluation    Patient location during evaluation: bedside  Patient participation: complete - patient participated  Level of consciousness: awake and alert  Pain score: 0  Airway patency: patent  Nausea & Vomiting: no nausea and no vomiting  Cardiovascular status: blood pressure returned to baseline and hemodynamically stable  Respiratory status: acceptable  Hydration status: euvolemic  Pain management: adequate        No notable events documented.

## 2024-03-25 ENCOUNTER — OFFICE VISIT (OUTPATIENT)
Dept: ORTHOPEDIC SURGERY | Facility: CLINIC | Age: 69
End: 2024-03-25
Payer: MEDICARE

## 2024-03-25 ENCOUNTER — CLINICAL SUPPORT (OUTPATIENT)
Dept: ORTHOPEDIC SURGERY | Facility: CLINIC | Age: 69
End: 2024-03-25
Payer: MEDICARE

## 2024-03-25 DIAGNOSIS — S62.522A DISPLACED FRACTURE OF DISTAL PHALANX OF LEFT THUMB, INITIAL ENCOUNTER FOR CLOSED FRACTURE: ICD-10-CM

## 2024-03-25 PROCEDURE — 99024 POSTOP FOLLOW-UP VISIT: CPT | Performed by: ORTHOPAEDIC SURGERY

## 2024-03-25 PROCEDURE — 73140 X-RAY EXAM OF FINGER(S): CPT | Mod: LEFT SIDE | Performed by: ORTHOPAEDIC SURGERY

## 2024-03-25 PROCEDURE — 1159F MED LIST DOCD IN RCRD: CPT | Performed by: ORTHOPAEDIC SURGERY

## 2024-03-25 PROCEDURE — 1036F TOBACCO NON-USER: CPT | Performed by: ORTHOPAEDIC SURGERY

## 2024-03-25 RX ORDER — OMEPRAZOLE 40 MG/1
40 CAPSULE, DELAYED RELEASE ORAL
COMMUNITY
Start: 2017-03-08

## 2024-03-25 NOTE — PROGRESS NOTES
3/25/2024    Chief Complaint   Patient presents with    Left Hand - Post-op     CRPP Lt thumb distal phalanx fx  DOS: 3/20/24  Xrays today        History of Present Illness:  Patient Anali Aranda , 69 y.o. female, presents today, 3/25/2024, for evaluation of left thumb  distal phalanx fracture, 5 days out from closed reduction percutaneous pin fixation .  She kept her postoperative dressing intact.  She states she has been compliant with nonweightbearing restrictions.  Minimal soreness discomfort.       Review of Systems:   GENERAL: Negative  GI: Negative  MUSCULOSKELETAL: See HPI  SKIN: Negative  NEURO:  Negative     Physical Exam:  GENERAL:  Alert and oriented to person, place, and time.  No acute distress and breathing comfortably; pleasant and cooperative with the examination.  HEENT:  Head is normocephalic and atraumatic.  NECK:  Supple, no visible swelling.  CARDIOVASCULAR:  No palpable tachycardia.  LUNGS:  No audible wheezing or labored breathing.  ABDOMEN:  Nondistended.  Extremities: The surgical incision is clean, dry, intact, and appears to be healing well.  No active bleeding, erythema, warmth, drainage, or signs of infection.  Appropriate functional ROM demonstrated with flexion/extension of the digits, and flexion/extension/pronosupination of the wrist.  She can flex and extend across the interphalangeal joint of the left thumb.     Imaging/Test Results:  3 views of the left thumb taken in the [facility] office show evidence of distal phalanx fracture status post closed reduction percutaneous pin fixation.  Adequate alignment throughout all 3 planes.  No evidence of hardware failure or migration of indwelling longitudinal K wires.     Assessment:  Left thumb distal phalanx fracture 5 days postop from closed reduction percutaneous pin fixation.     Plan:  Patient will continue strict nonweightbearing left upper extremity.  We discussed appropriate hand hygiene and pin care.  Follow-up with our  office in 4 weeks for repeat clinical and radiographic exam, x-rays 3 views of the left thumb upon return.  Discussed if she has increased swelling, redness, or any discharge from the pin sites to follow-up sooner.  Will give her a U-shaped AlumaFoam splint and additional [employer] supplies to wear when she is up and going over the next several weeks.  All questions answered at today's visit.    Donna Torres PA-C

## 2024-04-02 ENCOUNTER — APPOINTMENT (OUTPATIENT)
Dept: ORTHOPEDIC SURGERY | Facility: CLINIC | Age: 69
End: 2024-04-02
Payer: MEDICARE

## 2024-04-22 ENCOUNTER — OFFICE VISIT (OUTPATIENT)
Dept: ORTHOPEDIC SURGERY | Facility: CLINIC | Age: 69
End: 2024-04-22
Payer: MEDICARE

## 2024-04-22 ENCOUNTER — CLINICAL SUPPORT (OUTPATIENT)
Dept: ORTHOPEDIC SURGERY | Facility: CLINIC | Age: 69
End: 2024-04-22
Payer: MEDICARE

## 2024-04-22 DIAGNOSIS — M79.645 PAIN OF LEFT THUMB: ICD-10-CM

## 2024-04-22 DIAGNOSIS — S62.522A DISPLACED FRACTURE OF DISTAL PHALANX OF LEFT THUMB, INITIAL ENCOUNTER FOR CLOSED FRACTURE: Primary | ICD-10-CM

## 2024-04-22 PROCEDURE — 1159F MED LIST DOCD IN RCRD: CPT | Performed by: ORTHOPAEDIC SURGERY

## 2024-04-22 PROCEDURE — 1036F TOBACCO NON-USER: CPT | Performed by: ORTHOPAEDIC SURGERY

## 2024-04-22 PROCEDURE — 73140 X-RAY EXAM OF FINGER(S): CPT | Mod: LEFT SIDE | Performed by: ORTHOPAEDIC SURGERY

## 2024-04-22 PROCEDURE — 99024 POSTOP FOLLOW-UP VISIT: CPT | Performed by: ORTHOPAEDIC SURGERY

## 2024-04-22 NOTE — PROGRESS NOTES
4/22/2024    Chief Complaint   Patient presents with   • Left Hand - Post-op     CRPP Lt thumb distal phalanx fx  DOS: 3/20/24  Xrays today        History of Present Illness:  Patient Anali Aranda , 69 y.o. female, presents today, 4/22/2024, for evaluation of left thumb  distal phalanx fracture, 4 weeks post-op from CRPP .  She has done well in the interim since last visit.  She states the only pain she has at the very tip of the thumb where it feels hypersensitive secondary to indwelling pins.  Denies any swelling, erythema, warmth, bleeding or drainage from the pins.       Review of Systems:   GENERAL: Negative  GI: Negative  MUSCULOSKELETAL: See HPI  SKIN: Negative  NEURO:  Negative     Physical Exam:  GENERAL:  Alert and oriented to person, place, and time.  No acute distress and breathing comfortably; pleasant and cooperative with the examination.  HEENT:  Head is normocephalic and atraumatic.  NECK:  Supple, no visible swelling.  CARDIOVASCULAR:  No palpable tachycardia.  LUNGS:  No audible wheezing or labored breathing.  ABDOMEN:  Nondistended.  Extremities: Evaluation of left upper extremity finds the patient to have a palpable radial artery at the wrist with brisk capillary refill to all digits. The patient has intact sensorium to axillary, radial, median and ulnar nerves. There are no open wounds. There are no signs of infection. There is no evidence of lymphedema or lymphatic streaking. The patient has supple compartments of the left arm, forearm and hand.  Pin sites are clean dry and intact.  No swelling.     Imaging/Test Results:  3 views of the left thumb show evidence of healed distal phalanx fracture with continued good alignment no evidence of hardware for migration.     Assessment:  Left thumb distal phalanx fracture 4 weeks out from closed reduction percutaneous pin fixation.     Plan:  Recommendations were made for pin removal.  This performed in office today and [place] patient.  We  placed a sterile dressing that she will keep intact for 24 hours then transition to simple Band-Aid.  We discussed appropriate pin site care and hygiene.  She will follow-up with our office in 2 weeks for repeat clinical and radiographic exam, x-rays 3 views left thumb upon return.  All questions answered at today's visit.    Donna Torres PA-C    FOREIGN BODY REMOVAL - EMBEDDED [PRO84]    Date/Time: 4/22/2024 11:33 AM    Performed by: Donna Torres PA-C  Authorized by: Donna Torres PA-C    Consent:     Consent obtained:  Verbal    Consent given by:  Patient    Risks, benefits, and alternatives were discussed: yes      Risks discussed:  Bleeding and pain    Alternatives discussed:  Delayed treatment  Universal protocol:     Procedure explained and questions answered to patient or proxy's satisfaction: yes      Relevant documents present and verified: yes      Imaging studies available: yes      Required blood products, implants, devices, and special equipment available: yes      Site/side marked: yes      Immediately prior to procedure, a time out was called: yes      Patient identity confirmed:  Verbally with patient  Location:     Tendon involvement:  None  Pre-procedure details:     Imaging:  X-ray    Neurovascular status: intact      Preparation: Patient was prepped and draped in usual sterile fashion    Anesthesia:     Anesthesia method:  None  Procedure type:     Procedure complexity:  Simple  Procedure details:     Localization method:  Visualized    Dissection of underlying tissues: no      Removal mechanism:  Forceps    Foreign bodies recovered:  2    Description:  Indwelling surgical K wires    Intact foreign body removal: yes    Post-procedure details:     Neurovascular status: intact      Confirmation:  No additional foreign bodies on visualization    Skin closure:  None    Dressing:  Sterile dressing    Procedure completion:  Tolerated well, no immediate complications

## 2024-05-09 ENCOUNTER — APPOINTMENT (OUTPATIENT)
Dept: ORTHOPEDIC SURGERY | Facility: CLINIC | Age: 69
End: 2024-05-09
Payer: MEDICARE

## 2024-05-13 ENCOUNTER — APPOINTMENT (OUTPATIENT)
Dept: ORTHOPEDIC SURGERY | Facility: CLINIC | Age: 69
End: 2024-05-13
Payer: MEDICARE

## 2024-05-20 ENCOUNTER — OFFICE VISIT (OUTPATIENT)
Dept: ORTHOPEDIC SURGERY | Facility: CLINIC | Age: 69
End: 2024-05-20
Payer: MEDICARE

## 2024-05-20 ENCOUNTER — HOSPITAL ENCOUNTER (OUTPATIENT)
Dept: RADIOLOGY | Facility: CLINIC | Age: 69
Discharge: HOME | End: 2024-05-20
Payer: MEDICARE

## 2024-05-20 DIAGNOSIS — S62.522A DISPLACED FRACTURE OF DISTAL PHALANX OF LEFT THUMB, INITIAL ENCOUNTER FOR CLOSED FRACTURE: ICD-10-CM

## 2024-05-20 PROCEDURE — 73140 X-RAY EXAM OF FINGER(S): CPT | Mod: LT

## 2024-05-20 PROCEDURE — 1159F MED LIST DOCD IN RCRD: CPT | Performed by: ORTHOPAEDIC SURGERY

## 2024-05-20 PROCEDURE — 1036F TOBACCO NON-USER: CPT | Performed by: ORTHOPAEDIC SURGERY

## 2024-05-20 PROCEDURE — 99024 POSTOP FOLLOW-UP VISIT: CPT | Performed by: ORTHOPAEDIC SURGERY

## 2024-05-20 PROCEDURE — 73140 X-RAY EXAM OF FINGER(S): CPT | Mod: LEFT SIDE | Performed by: RADIOLOGY

## 2024-05-20 NOTE — PROGRESS NOTES
5/20/2024    Chief Complaint   Patient presents with    Left Hand - Follow-up     CRPP Lt thumb distal phalanx fx  DOS: 3/20/24  Xrays today          History of Present Illness:  Patient Anali Aranda , 69 y.o. female, presents today, 5/20/2024, for evaluation of left thumb  distal phalanx fracture, approximately 2 months out from closed reduction percutaneous pin fixation.  Pins removed about 4 weeks ago.  She is working home program for motion recovery.  Minimal soreness and stiffness that continues to improve over time.  Overall she is happy with her outcome so far.  She is inquiring about returning to normal activities and hobbies including golfing. .         Review of Systems:   GENERAL: Negative  GI: Negative  MUSCULOSKELETAL: See HPI  SKIN: Negative  NEURO:  Negative     Physical Exam:  GENERAL:  Alert and oriented to person, place, and time.  No acute distress and breathing comfortably; pleasant and cooperative with the examination.  HEENT:  Head is normocephalic and atraumatic.  NECK:  Supple, no visible swelling.  CARDIOVASCULAR:  No palpable tachycardia.  LUNGS:  No audible wheezing or labored breathing.  ABDOMEN:  Nondistended.  Extremities: Evaluation of left upper extremity finds the patient to have a palpable radial artery at the wrist with brisk capillary refill to all digits. The patient has intact sensorium to axillary, radial, median and ulnar nerves. There are no open wounds. There are no signs of infection. There is no evidence of lymphedema or lymphatic streaking. The patient has supple compartments of the left arm, forearm and hand.  Pin sites are well-healed.  She has very minimal tenderness palpation over distal phalanx.  She has appropriate flexion extension maintained intact across the interphalangeal joint.     Imaging/Test Results:  3 views of the left thumb show evidence of healed distal phalanx fracture.  Previously indwelling K wires have been removed.  Completeness of healing  callus remission noted.     Assessment:  Left thumb distal phalanx fracture, 2 months out from closed reduction percutaneous pin fixation doing well.        Plan:  Patient to return activities and weightbearing as tolerated in the left upper extremity.  She will follow-up with our office on an as needed basis.  All questions answered at today's visit.    Donna Torres PA-C

## 2024-06-13 ENCOUNTER — APPOINTMENT (OUTPATIENT)
Dept: DERMATOLOGY | Facility: CLINIC | Age: 69
End: 2024-06-13
Payer: MEDICARE

## 2024-06-28 ENCOUNTER — HOSPITAL ENCOUNTER (OUTPATIENT)
Dept: RADIOLOGY | Facility: HOSPITAL | Age: 69
Discharge: HOME | End: 2024-06-28
Payer: MEDICARE

## 2024-06-28 DIAGNOSIS — R05.2 SUBACUTE COUGH: ICD-10-CM

## 2024-06-28 PROCEDURE — 71046 X-RAY EXAM CHEST 2 VIEWS: CPT

## 2024-07-16 PROBLEM — C44.92 SCC (SQUAMOUS CELL CARCINOMA): Status: ACTIVE | Noted: 2019-06-04

## 2024-07-16 PROBLEM — C44.92 SQUAMOUS CELL SKIN CANCER: Status: ACTIVE | Noted: 2017-03-08

## 2024-07-16 PROBLEM — L57.0 AK (ACTINIC KERATOSIS): Status: ACTIVE | Noted: 2017-09-25

## 2024-07-16 PROBLEM — C44.91 BASAL CELL CARCINOMA: Status: ACTIVE | Noted: 2017-03-08

## 2024-07-16 PROBLEM — L82.1 SK (SEBORRHEIC KERATOSIS): Status: ACTIVE | Noted: 2023-10-10

## 2024-07-16 PROBLEM — L30.9 DERMATITIS: Status: ACTIVE | Noted: 2017-05-19

## 2024-07-18 ENCOUNTER — APPOINTMENT (OUTPATIENT)
Dept: DERMATOLOGY | Facility: CLINIC | Age: 69
End: 2024-07-18
Payer: MEDICARE

## 2024-07-18 DIAGNOSIS — D18.01 HEMANGIOMA OF SKIN: ICD-10-CM

## 2024-07-18 DIAGNOSIS — L90.5 SCAR CONDITIONS AND FIBROSIS OF SKIN: Primary | ICD-10-CM

## 2024-07-18 DIAGNOSIS — L81.4 LENTIGO: ICD-10-CM

## 2024-07-18 DIAGNOSIS — D48.5 NEOPLASM OF UNCERTAIN BEHAVIOR OF SKIN: ICD-10-CM

## 2024-07-18 DIAGNOSIS — L57.0 ACTINIC KERATOSIS: ICD-10-CM

## 2024-07-18 DIAGNOSIS — Z80.8 FAMILY HISTORY OF SKIN CANCER: ICD-10-CM

## 2024-07-18 DIAGNOSIS — L82.1 SEBORRHEIC KERATOSIS: ICD-10-CM

## 2024-07-18 PROCEDURE — 17003 DESTRUCT PREMALG LES 2-14: CPT | Performed by: DERMATOLOGY

## 2024-07-18 PROCEDURE — 99213 OFFICE O/P EST LOW 20 MIN: CPT | Performed by: DERMATOLOGY

## 2024-07-18 PROCEDURE — 1036F TOBACCO NON-USER: CPT | Performed by: DERMATOLOGY

## 2024-07-18 PROCEDURE — 1159F MED LIST DOCD IN RCRD: CPT | Performed by: DERMATOLOGY

## 2024-07-18 PROCEDURE — 17000 DESTRUCT PREMALG LESION: CPT | Performed by: DERMATOLOGY

## 2024-07-18 ASSESSMENT — DERMATOLOGY QUALITY OF LIFE (QOL) ASSESSMENT
RATE HOW BOTHERED YOU ARE BY EFFECTS OF YOUR SKIN PROBLEMS ON YOUR ACTIVITIES (EG, GOING OUT, ACCOMPLISHING WHAT YOU WANT, WORK ACTIVITIES OR YOUR RELATIONSHIPS WITH OTHERS): 0 - NEVER BOTHERED
DATE THE QUALITY-OF-LIFE ASSESSMENT WAS COMPLETED: 67039
RATE HOW EMOTIONALLY BOTHERED YOU ARE BY YOUR SKIN PROBLEM (FOR EXAMPLE, WORRY, EMBARRASSMENT, FRUSTRATION): 0 - NEVER BOTHERED
WHAT SINGLE SKIN CONDITION LISTED BELOW IS THE PATIENT ANSWERING THE QUALITY-OF-LIFE ASSESSMENT QUESTIONS ABOUT: NONE OF THE ABOVE
RATE HOW BOTHERED YOU ARE BY SYMPTOMS OF YOUR SKIN PROBLEM (EG, ITCHING, STINGING BURNING, HURTING OR SKIN IRRITATION): 0 - NEVER BOTHERED
ARE THERE EXCLUSIONS OR EXCEPTIONS FOR THE QUALITY OF LIFE ASSESSMENT: NO

## 2024-07-18 ASSESSMENT — DERMATOLOGY PATIENT ASSESSMENT
DO YOU USE A TANNING BED: NO
ARE YOU AN ORGAN TRANSPLANT RECIPIENT: NO
HAVE YOU HAD OR DO YOU HAVE A STAPH INFECTION: NO
DO YOU USE SUNSCREEN: OCCASIONALLY
DO YOU HAVE ANY NEW OR CHANGING LESIONS: YES
HAVE YOU HAD OR DO YOU HAVE VASCULAR DISEASE: NO

## 2024-07-18 ASSESSMENT — PATIENT GLOBAL ASSESSMENT (PGA): PATIENT GLOBAL ASSESSMENT: PATIENT GLOBAL ASSESSMENT:  1 - CLEAR

## 2024-07-18 ASSESSMENT — ITCH NUMERIC RATING SCALE: HOW SEVERE IS YOUR ITCHING?: 0

## 2024-07-18 NOTE — PROGRESS NOTES
Subjective     Anali Aranda is a 69 y.o. female who presents for the following: Skin Check.     Skin Cancer Screening  She has a history of heavy sun exposure. She is in the sun occasionally. She uses sunscreen occasionally. She reports no skin symptoms. Her moles are not changing.    Spots that concern her: right ankle, right back, left lower eyelid, right nose, left shoulder, left lower leg          Review of Systems:  No other skin or systemic complaints other than what is documented elsewhere in the note.    The following portions of the chart were reviewed this encounter and updated as appropriate:       Skin Cancer History  Biopsy Date Type Location Status   12/14/23 SCC Left posterior lower leg Treatment Complete  1/12/24     Specialty Problems          Dermatology Problems    Basal cell carcinoma    Squamous cell skin cancer    Dermatitis    AK (actinic keratosis)    SCC (squamous cell carcinoma)    Actinic keratosis    Dermatitis, unspecified    Hemangioma of skin and subcutaneous tissue    Hypertrophic scar    Melanocytic nevi of trunk    Melanocytic nevi of unspecified lower limb, including hip    Melanocytic nevi of unspecified part of face    Melanocytic nevi of unspecified upper limb, including shoulder    Neoplasm of uncertain behavior of skin    Other melanin hyperpigmentation    Other seborrheic keratosis    Personal history of malignant melanoma of skin    Psoriasis vulgaris    Rash and other nonspecific skin eruption    Scar condition and fibrosis of skin    Squamous cell carcinoma of skin of right lower limb, including hip    Tinea unguium    SK (seborrheic keratosis)     Past Medical History:  Anali Aranda  has a past medical history of Actinic keratosis (03/08/2017), AK (actinic keratosis) (09/25/2017), AK (actinic keratosis) (02/08/2021), Basal cell carcinoma (03/08/2017), Dermatitis (05/19/2017), Migraines, SCC (squamous cell carcinoma) (06/04/2019), SCC (squamous cell carcinoma)  (02/08/2021), SCC (squamous cell carcinoma) (12/14/2023), SK (seborrheic keratosis) (10/10/2023), and Squamous cell skin cancer (03/08/2017).    Past Surgical History:  Anali Aranda  has no past surgical history on file.    Family History:  Patient family history includes Melanoma in her son and another family member; basal/squamous cell cancer in an other family member.       Objective   Well appearing patient in no apparent distress; mood and affect are within normal limits.    A full examination was performed including scalp, head, eyes, ears, nose, lips, neck, chest, axillae, abdomen, back, buttocks, bilateral upper extremities, bilateral lower extremities, hands, feet, fingers, toes, fingernails, and toenails. All findings within normal limits unless otherwise noted below.    Assessment/Plan   1. Hemangioma of skin  Scattered cherry-red papule(s).    Benign finding; no intervention indicated today.    2. Neoplasm of uncertain behavior of skin    Related Procedures  Follow Up In Dermatology - Established Patient    3. Lentigo  Scattered tan macules in sun-exposed areas.    The ABCDEs of melanoma and warning signs of non-melanoma skin cancer were discussed with patient and patient expressed understanding. Sun protection and use of at least SPF 30 discussed with patient. Pt instructed to reapply every 2 hours.     4. Seborrheic keratosis  Stuck on verrucous, tan-brown papules and plaques.      Benign finding; no intervention indicated today.    5. Scar conditions and fibrosis of skin  Scars are well-healed without evidence of recurrence    Reassured patient that previous procedure sites look healthy    6. Actinic keratosis (3)  Chest - Medial (Center), Right Breast, Right Nasal Sidewall  Destruction of Actinic Keratosis Procedure Note  Diagnosis: AK  Location: see physical exam  Informed consent: Discussed risks (permanent scarring, light or dark discoloration, infection, pain, bleeding, bruising, redness,  blister formation, and recurrence of the lesion) and benefits of the procedure, as well as the alternatives.  Informed consent was obtained.  Anesthesia: not required  Procedure Details:  The lesion was destroyed with liquid nitrogen. The patient tolerated procedure well.  Total number of lesions treated:  3  Plan:  The patient was instructed on post-op care. Recommend OTC analgesia as needed for pain.      Destr of lesion - Chest - Medial (Center), Right Breast, Right Nasal Sidewall  Complexity: simple    Destruction method: cryotherapy    Informed consent: discussed and consent obtained    Lesion destroyed using liquid nitrogen: Yes    Cryotherapy cycles:  1  Outcome: patient tolerated procedure well with no complications    Post-procedure details: wound care instructions given         Follow up 6 months or sooner as needed.

## 2024-08-28 ENCOUNTER — HOSPITAL ENCOUNTER (OUTPATIENT)
Dept: RADIOLOGY | Facility: HOSPITAL | Age: 69
Discharge: HOME | End: 2024-08-28
Payer: MEDICARE

## 2024-08-28 DIAGNOSIS — Z87.19 PERSONAL HISTORY OF OTHER DISEASES OF THE DIGESTIVE SYSTEM: ICD-10-CM

## 2024-08-28 PROCEDURE — 74019 RADEX ABDOMEN 2 VIEWS: CPT

## 2025-01-14 ASSESSMENT — DERMATOLOGY QUALITY OF LIFE (QOL) ASSESSMENT
RATE HOW BOTHERED YOU ARE BY SYMPTOMS OF YOUR SKIN PROBLEM (EG, ITCHING, STINGING BURNING, HURTING OR SKIN IRRITATION): 0 - NEVER BOTHERED
RATE HOW BOTHERED YOU ARE BY SYMPTOMS OF YOUR SKIN PROBLEM (EG, ITCHING, STINGING BURNING, HURTING OR SKIN IRRITATION): 0 - NEVER BOTHERED
RATE HOW BOTHERED YOU ARE BY EFFECTS OF YOUR SKIN PROBLEMS ON YOUR ACTIVITIES (EG, GOING OUT, ACCOMPLISHING WHAT YOU WANT, WORK ACTIVITIES OR YOUR RELATIONSHIPS WITH OTHERS): 0 - NEVER BOTHERED
RATE HOW EMOTIONALLY BOTHERED YOU ARE BY YOUR SKIN PROBLEM (FOR EXAMPLE, WORRY, EMBARRASSMENT, FRUSTRATION): 0 - NEVER BOTHERED
WHAT SINGLE SKIN CONDITION LISTED BELOW IS THE PATIENT ANSWERING THE QUALITY-OF-LIFE ASSESSMENT QUESTIONS ABOUT: ACTINIC KERATOSIS
RATE HOW EMOTIONALLY BOTHERED YOU ARE BY YOUR SKIN PROBLEM (FOR EXAMPLE, WORRY, EMBARRASSMENT, FRUSTRATION): 0 - NEVER BOTHERED
WHAT SINGLE SKIN CONDITION LISTED BELOW IS THE PATIENT ANSWERING THE QUALITY-OF-LIFE ASSESSMENT QUESTIONS ABOUT: ACTINIC KERATOSIS
RATE HOW BOTHERED YOU ARE BY EFFECTS OF YOUR SKIN PROBLEMS ON YOUR ACTIVITIES (EG, GOING OUT, ACCOMPLISHING WHAT YOU WANT, WORK ACTIVITIES OR YOUR RELATIONSHIPS WITH OTHERS): 0 - NEVER BOTHERED

## 2025-01-16 ENCOUNTER — APPOINTMENT (OUTPATIENT)
Dept: DERMATOLOGY | Facility: CLINIC | Age: 70
End: 2025-01-16
Payer: MEDICARE

## 2025-01-16 DIAGNOSIS — D18.01 HEMANGIOMA OF SKIN: ICD-10-CM

## 2025-01-16 DIAGNOSIS — L90.5 SCAR CONDITIONS AND FIBROSIS OF SKIN: Primary | ICD-10-CM

## 2025-01-16 DIAGNOSIS — L81.4 LENTIGO: ICD-10-CM

## 2025-01-16 DIAGNOSIS — L82.1 SEBORRHEIC KERATOSIS: ICD-10-CM

## 2025-01-16 DIAGNOSIS — Z85.828 PERSONAL HISTORY OF SKIN CANCER: ICD-10-CM

## 2025-01-16 DIAGNOSIS — L57.0 ACTINIC KERATOSIS: ICD-10-CM

## 2025-01-16 PROCEDURE — 99213 OFFICE O/P EST LOW 20 MIN: CPT | Performed by: DERMATOLOGY

## 2025-01-16 PROCEDURE — 17000 DESTRUCT PREMALG LESION: CPT | Performed by: DERMATOLOGY

## 2025-01-16 PROCEDURE — 1036F TOBACCO NON-USER: CPT | Performed by: DERMATOLOGY

## 2025-01-16 PROCEDURE — 17003 DESTRUCT PREMALG LES 2-14: CPT | Performed by: DERMATOLOGY

## 2025-01-16 PROCEDURE — 1159F MED LIST DOCD IN RCRD: CPT | Performed by: DERMATOLOGY

## 2025-01-16 NOTE — PROGRESS NOTES
Subjective     Anali Aranda is a 69 y.o. female who presents for the following: Skin Check (6 month FBSE. Hx of BCC, SCC, and AK. Pt reports areas of concern located Right Upper Back, B/L legs, Left Cheek, and Forehead. ).     Skin Cancer Screening  She has a history of heavy sun exposure. She is in the sun frequently. She uses sunscreen frequently. She reports no skin symptoms. Her moles are itching.    Spots that concern her: Right Upper Back, B/L legs, Left Cheek, and Forehead.     Hx of BCC (3/8/17) and SCC (12/14/23, 2/8/21, 6/4/19, 3/8/17), and AK     Review of Systems:  No other skin or systemic complaints other than what is documented elsewhere in the note.    The following portions of the chart were reviewed this encounter and updated as appropriate:       Skin Cancer History  Biopsy Date Type Location Status   12/14/23 SCC Left posterior lower leg Treatment Complete  1/12/24     Specialty Problems          Dermatology Problems    Basal cell carcinoma    Squamous cell skin cancer    Dermatitis    AK (actinic keratosis)    SCC (squamous cell carcinoma)    Actinic keratosis    Dermatitis, unspecified    Hemangioma of skin and subcutaneous tissue    Hypertrophic scar    Melanocytic nevi of trunk    Melanocytic nevi of unspecified lower limb, including hip    Melanocytic nevi of unspecified part of face    Melanocytic nevi of unspecified upper limb, including shoulder    Neoplasm of uncertain behavior of skin    Other melanin hyperpigmentation    Other seborrheic keratosis    Personal history of malignant melanoma of skin    Psoriasis vulgaris    Rash and other nonspecific skin eruption    Scar condition and fibrosis of skin    Squamous cell carcinoma of skin of right lower limb, including hip    Tinea unguium    Unspecified open wound, left lower leg, subsequent encounter    SK (seborrheic keratosis)     Past Medical History:  Anali Aranda  has a past medical history of Actinic keratosis (03/08/2017),  AK (actinic keratosis) (09/25/2017), AK (actinic keratosis) (02/08/2021), Basal cell carcinoma (03/08/2017), Dermatitis (05/19/2017), Migraines, SCC (squamous cell carcinoma) (06/04/2019), SCC (squamous cell carcinoma) (02/08/2021), SCC (squamous cell carcinoma) (12/14/2023), SK (seborrheic keratosis) (10/10/2023), and Squamous cell skin cancer (03/08/2017).    Past Surgical History:  Anali Aranda  has no past surgical history on file.    Family History:  Patient family history includes Melanoma in her son and another family member; basal/squamous cell cancer in an other family member.       Objective   Well appearing patient in no apparent distress; mood and affect are within normal limits.    A full examination was performed including scalp, head, eyes, ears, nose, lips, neck, chest, axillae, abdomen, back, buttocks, bilateral upper extremities, bilateral lower extremities, hands, feet, fingers, toes, fingernails, and toenails. All findings within normal limits unless otherwise noted below.    Assessment/Plan   1. Actinic keratosis (5)  Head - Anterior (Face) (2), Left Thigh - Anterior (3)  Destruction of Actinic Keratosis Procedure Note  Diagnosis: AK  Location: see physical exam  Informed consent: Discussed risks (permanent scarring, light or dark discoloration, infection, pain, bleeding, bruising, redness, blister formation, and recurrence of the lesion) and benefits of the procedure, as well as the alternatives.  Informed consent was obtained.  Anesthesia: not required  Procedure Details:  The lesion was destroyed with liquid nitrogen. The patient tolerated procedure well.  Total number of lesions treated:  5  Plan:  The patient was instructed on post-op care. Recommend OTC analgesia as needed for pain.      Destr of lesion - Head - Anterior (Face) (2), Left Thigh - Anterior (3)  Complexity: simple    Destruction method: cryotherapy    Informed consent: discussed and consent obtained    Lesion destroyed  using liquid nitrogen: Yes    Region frozen until ice ball extended beyond lesion: Yes    Cryotherapy cycles:  1  Outcome: patient tolerated procedure well with no complications    Post-procedure details: wound care instructions given      2. Hemangioma of skin  Scattered cherry-red papule(s).    Benign finding; no intervention indicated today.    3. Lentigo  Scattered tan macules in sun-exposed areas.    The ABCDEs of melanoma and warning signs of non-melanoma skin cancer were discussed with patient and patient expressed understanding. Sun protection and use of at least SPF 30 discussed with patient. Pt instructed to reapply every 2 hours.     4. Scar conditions and fibrosis of skin  Scars are well-healed without evidence of recurrence    Reassured patient that previous procedure sites look healthy    5. Seborrheic keratosis  Stuck on verrucous, tan-brown papules and plaques.      Benign finding; no intervention indicated today.       Follow up 6 months or sooner as needed.

## 2025-07-22 ASSESSMENT — DERMATOLOGY QUALITY OF LIFE (QOL) ASSESSMENT
RATE HOW EMOTIONALLY BOTHERED YOU ARE BY YOUR SKIN PROBLEM (FOR EXAMPLE, WORRY, EMBARRASSMENT, FRUSTRATION): 3
RATE HOW BOTHERED YOU ARE BY SYMPTOMS OF YOUR SKIN PROBLEM (EG, ITCHING, STINGING BURNING, HURTING OR SKIN IRRITATION): 0 - NEVER BOTHERED
RATE HOW EMOTIONALLY BOTHERED YOU ARE BY YOUR SKIN PROBLEM (FOR EXAMPLE, WORRY, EMBARRASSMENT, FRUSTRATION): 3
RATE HOW BOTHERED YOU ARE BY EFFECTS OF YOUR SKIN PROBLEMS ON YOUR ACTIVITIES (EG, GOING OUT, ACCOMPLISHING WHAT YOU WANT, WORK ACTIVITIES OR YOUR RELATIONSHIPS WITH OTHERS): 0 - NEVER BOTHERED
RATE HOW BOTHERED YOU ARE BY SYMPTOMS OF YOUR SKIN PROBLEM (EG, ITCHING, STINGING BURNING, HURTING OR SKIN IRRITATION): 0 - NEVER BOTHERED
RATE HOW BOTHERED YOU ARE BY EFFECTS OF YOUR SKIN PROBLEMS ON YOUR ACTIVITIES (EG, GOING OUT, ACCOMPLISHING WHAT YOU WANT, WORK ACTIVITIES OR YOUR RELATIONSHIPS WITH OTHERS): 0 - NEVER BOTHERED

## 2025-07-24 ENCOUNTER — APPOINTMENT (OUTPATIENT)
Dept: DERMATOLOGY | Facility: CLINIC | Age: 70
End: 2025-07-24
Payer: MEDICARE

## 2025-07-24 DIAGNOSIS — L81.4 LENTIGO: ICD-10-CM

## 2025-07-24 DIAGNOSIS — L82.1 SEBORRHEIC KERATOSIS: ICD-10-CM

## 2025-07-24 DIAGNOSIS — L57.0 ACTINIC KERATOSIS: ICD-10-CM

## 2025-07-24 DIAGNOSIS — Z12.83 ENCOUNTER FOR SCREENING FOR MALIGNANT NEOPLASM OF SKIN: ICD-10-CM

## 2025-07-24 DIAGNOSIS — L90.5 SCAR CONDITIONS AND FIBROSIS OF SKIN: Primary | ICD-10-CM

## 2025-07-24 DIAGNOSIS — D18.01 HEMANGIOMA OF SKIN: ICD-10-CM

## 2025-07-24 DIAGNOSIS — Z85.828 PERSONAL HISTORY OF SKIN CANCER: ICD-10-CM

## 2025-07-24 PROCEDURE — 1036F TOBACCO NON-USER: CPT | Performed by: DERMATOLOGY

## 2025-07-24 PROCEDURE — 1159F MED LIST DOCD IN RCRD: CPT | Performed by: DERMATOLOGY

## 2025-07-24 PROCEDURE — 99213 OFFICE O/P EST LOW 20 MIN: CPT | Performed by: DERMATOLOGY

## 2025-07-24 PROCEDURE — 17003 DESTRUCT PREMALG LES 2-14: CPT | Performed by: DERMATOLOGY

## 2025-07-24 PROCEDURE — 17000 DESTRUCT PREMALG LESION: CPT | Performed by: DERMATOLOGY

## 2025-07-24 NOTE — Clinical Note
Destruction of Actinic Keratosis Procedure Note  Diagnosis: AK  Location: see physical exam  Informed consent: Discussed risks (permanent scarring, light or dark discoloration, infection, pain, bleeding, bruising, redness, blister formation, and recurrence of the lesion) and benefits of the procedure, as well as the alternatives.  Informed consent was obtained.  Anesthesia: not required  Procedure Details:  The lesion was destroyed with liquid nitrogen. The patient tolerated procedure well.  Total number of lesions treated:  3  Plan:  The patient was instructed on post-op care. Recommend OTC analgesia as needed for pain.

## 2025-07-24 NOTE — PROGRESS NOTES
Subjective     Anali Aranda is a 70 y.o. female who presents for the following: Skin Check (6 Month. Hx of SCC, BCC, and AK. Family Hx of MM - Son. Pt reports areas of concern located on Mid Back, B/L Legs, Nose, Left Posterior Ear, and Left Axilla. ).     Skin Cancer Screening  She has a history of heavy sun exposure. She is in the sun daily. She uses sunscreen daily. She reports no skin symptoms. Her moles are not changing.    Spots that concern her: Mid Back, B/L Legs, Nose, Left Posterior Ear, and Left Axilla.     SCC - 12/14/23, 2/8/21, 6/4/19, 3/8/17  BCC - 3/8/17  AK   Family Hx of MM - Son.     Review of Systems:  No other skin or systemic complaints other than what is documented elsewhere in the note.    The following portions of the chart were reviewed this encounter and updated as appropriate:       Skin Cancer History  Biopsy Log Book  Biopsied Type Location Status   12/14/23 SCC Left posterior lower leg Treatment Complete  1/12/24       Additional History      Specialty Problems          Dermatology Problems    Basal cell carcinoma    Squamous cell skin cancer    Dermatitis    AK (actinic keratosis)    SCC (squamous cell carcinoma)    Actinic keratosis    Dermatitis, unspecified    Hemangioma of skin and subcutaneous tissue    Hypertrophic scar    Melanocytic nevi of trunk    Melanocytic nevi of unspecified lower limb, including hip    Melanocytic nevi of unspecified part of face    Melanocytic nevi of unspecified upper limb, including shoulder    Neoplasm of uncertain behavior of skin    Other melanin hyperpigmentation    Other seborrheic keratosis    Personal history of malignant melanoma of skin    Psoriasis vulgaris    Rash and other nonspecific skin eruption    Scar condition and fibrosis of skin    Squamous cell carcinoma of skin of right lower limb, including hip    Tinea unguium    Unspecified open wound, left lower leg, subsequent encounter    SK (seborrheic keratosis)     Past Medical  History:  Anali Aranda  has a past medical history of Actinic keratosis (03/08/2017), AK (actinic keratosis) (09/25/2017), AK (actinic keratosis) (02/08/2021), Basal cell carcinoma (03/08/2017), Dermatitis (05/19/2017), Migraines, SCC (squamous cell carcinoma) (06/04/2019), SCC (squamous cell carcinoma) (02/08/2021), SCC (squamous cell carcinoma) (12/14/2023), SK (seborrheic keratosis) (10/10/2023), and Squamous cell skin cancer (03/08/2017).    Past Surgical History:  Anali Aranda  has a past surgical history that includes Skin biopsy and Mohs surgery.    Family History:  Patient family history includes Melanoma in her son and another family member; Osteoporosis in her mother; basal/squamous cell cancer in an other family member.       Objective   Well appearing patient in no apparent distress; mood and affect are within normal limits.    A full examination was performed including scalp, head, eyes, ears, nose, lips, neck, chest, axillae, abdomen, back, buttocks, bilateral upper extremities, bilateral lower extremities, hands, feet, fingers, toes, fingernails, and toenails. All findings within normal limits unless otherwise noted below.    Assessment/Plan   Skin Exam  1. HEMANGIOMA OF SKIN  Generalized  Scattered cherry-red papule(s).  Benign finding; no intervention indicated today.  2. LENTIGO  Generalized  Scattered tan macules in sun-exposed areas.  The ABCDEs of melanoma and warning signs of non-melanoma skin cancer were discussed with patient and patient expressed understanding. Sun protection and use of at least SPF 30 discussed with patient. Pt instructed to reapply every 2 hours.   3. SCAR CONDITIONS AND FIBROSIS OF SKIN  Generalized  Scars are well-healed without evidence of recurrence  Reassured patient that previous procedure sites look healthy  4. SEBORRHEIC KERATOSIS  Generalized  Stuck on verrucous, tan-brown papules and plaques.    Benign finding; no intervention indicated today.  5. ACTINIC  KERATOSIS (3)  Left Lower Leg - Anterior, Right Breast, Right Lower Leg - Anterior  Destruction of Actinic Keratosis Procedure Note  Diagnosis: AK  Location: see physical exam  Informed consent: Discussed risks (permanent scarring, light or dark discoloration, infection, pain, bleeding, bruising, redness, blister formation, and recurrence of the lesion) and benefits of the procedure, as well as the alternatives.  Informed consent was obtained.  Anesthesia: not required  Procedure Details:  The lesion was destroyed with liquid nitrogen. The patient tolerated procedure well.  Total number of lesions treated:  3  Plan:  The patient was instructed on post-op care. Recommend OTC analgesia as needed for pain.    - Destr of lesion - Left Lower Leg - Anterior, Right Breast, Right Lower Leg - Anterior  Complexity: simple    Destruction method: cryotherapy    Informed consent: discussed and consent obtained    Lesion destroyed using liquid nitrogen: Yes    Region frozen until ice ball extended beyond lesion: Yes    Cryotherapy cycles:  1  Outcome: patient tolerated procedure well with no complications    Post-procedure details: wound care instructions given         Follow up 6 months or sooner as needed.

## 2026-01-29 ENCOUNTER — APPOINTMENT (OUTPATIENT)
Dept: DERMATOLOGY | Facility: CLINIC | Age: 71
End: 2026-01-29
Payer: MEDICARE

## (undated) DEVICE — DRESSING PETRO W3XL8IN OIL EMUL N ADH GZ KNIT IMPREG CELOS

## (undated) DEVICE — HAND: Brand: MEDLINE INDUSTRIES, INC.

## (undated) DEVICE — SPLINT CAST W3XL15IN GRN STRENGTH PLSTR OF PARIS FAST SET

## (undated) DEVICE — GLOVE ORTHO 7 1/2   MSG9475

## (undated) DEVICE — GLOVE ORANGE PI 7   MSG9070

## (undated) DEVICE — GOWN,AURORA,NONREINFORCED,LARGE: Brand: MEDLINE

## (undated) DEVICE — C-ARM: Brand: UNBRANDED

## (undated) DEVICE — BANDAGE COMPR W3INXL5YD WHT BGE POLY COT M E WRP WV HK AND

## (undated) DEVICE — GLOVE ORANGE PI 7 1/2   MSG9075

## (undated) DEVICE — PADDING CAST N ADH 4 YDX3 IN HIGHLY ABSORBENT EZ APPL SOFROL